# Patient Record
Sex: MALE | Race: OTHER | NOT HISPANIC OR LATINO | URBAN - METROPOLITAN AREA
[De-identification: names, ages, dates, MRNs, and addresses within clinical notes are randomized per-mention and may not be internally consistent; named-entity substitution may affect disease eponyms.]

---

## 2024-02-06 ENCOUNTER — APPOINTMENT (OUTPATIENT)
Dept: LAB | Facility: HOSPITAL | Age: 39
End: 2024-02-06

## 2024-02-06 DIAGNOSIS — Z00.00 ROUTINE GENERAL MEDICAL EXAMINATION AT A HEALTH CARE FACILITY: ICD-10-CM

## 2024-02-06 LAB
HBV SURFACE AB SER-ACNC: 6.26 MIU/ML
TREPONEMA PALLIDUM IGG+IGM AB [PRESENCE] IN SERUM OR PLASMA BY IMMUNOASSAY: NORMAL
VZV IGG SER QL IA: NORMAL

## 2024-02-06 PROCEDURE — 87591 N.GONORRHOEAE DNA AMP PROB: CPT

## 2024-02-06 PROCEDURE — 86706 HEP B SURFACE ANTIBODY: CPT

## 2024-02-06 PROCEDURE — 86780 TREPONEMA PALLIDUM: CPT

## 2024-02-06 PROCEDURE — 87491 CHLMYD TRACH DNA AMP PROBE: CPT

## 2024-02-06 PROCEDURE — 36415 COLL VENOUS BLD VENIPUNCTURE: CPT

## 2024-02-06 PROCEDURE — 86787 VARICELLA-ZOSTER ANTIBODY: CPT

## 2024-02-06 PROCEDURE — 86480 TB TEST CELL IMMUN MEASURE: CPT

## 2024-02-07 LAB
C TRACH DNA SPEC QL NAA+PROBE: NEGATIVE
GAMMA INTERFERON BACKGROUND BLD IA-ACNC: 0.09 IU/ML
M TB IFN-G BLD-IMP: NEGATIVE
M TB IFN-G CD4+ BCKGRND COR BLD-ACNC: -0.02 IU/ML
M TB IFN-G CD4+ BCKGRND COR BLD-ACNC: -0.03 IU/ML
MITOGEN IGNF BCKGRD COR BLD-ACNC: 9.91 IU/ML
N GONORRHOEA DNA SPEC QL NAA+PROBE: NEGATIVE

## 2024-06-14 ENCOUNTER — HOSPITAL ENCOUNTER (EMERGENCY)
Facility: HOSPITAL | Age: 39
Discharge: HOME | End: 2024-06-14
Attending: EMERGENCY MEDICINE
Payer: COMMERCIAL

## 2024-06-14 VITALS
BODY MASS INDEX: 24.57 KG/M2 | DIASTOLIC BLOOD PRESSURE: 91 MMHG | HEART RATE: 63 BPM | WEIGHT: 156.53 LBS | OXYGEN SATURATION: 100 % | HEIGHT: 67 IN | SYSTOLIC BLOOD PRESSURE: 154 MMHG | TEMPERATURE: 98.3 F | RESPIRATION RATE: 20 BRPM

## 2024-06-14 DIAGNOSIS — T63.441A BEE STING: Primary | ICD-10-CM

## 2024-06-14 PROCEDURE — 99284 EMERGENCY DEPT VISIT MOD MDM: CPT | Performed by: PHYSICIAN ASSISTANT

## 2024-06-14 PROCEDURE — 99282 EMERGENCY DEPT VISIT SF MDM: CPT

## 2024-06-14 RX ORDER — PREDNISONE 20 MG/1
60 TABLET ORAL DAILY
Qty: 15 TABLET | Refills: 0 | Status: SHIPPED | OUTPATIENT
Start: 2024-06-14 | End: 2024-06-19

## 2024-06-14 NOTE — ED PROVIDER NOTES
History  Chief Complaint   Patient presents with    Insect Bite     Pt was stung by a bee on the face. Pt has hx of having allergic reactions to bee stings. Pt does not currently have any sings of an allergic reaction. Pt has some swelling under left eye where he was stung     39 yo here after a bee sting. Stung about one hour ago. Left zygomatic area. Iced the area afterwards. Denies any swelling, itching, rash, lightheadedness, n/v, sob, wheezing. Previously had facial swelling after bee sting.       History provided by:  Patient   used: No    Insect Bite  Associated symptoms: no fever and no rash        None       History reviewed. No pertinent past medical history.    History reviewed. No pertinent surgical history.    History reviewed. No pertinent family history.  I have reviewed and agree with the history as documented.    E-Cigarette/Vaping     E-Cigarette/Vaping Substances     Social History     Tobacco Use    Smoking status: Never    Smokeless tobacco: Never   Substance Use Topics    Alcohol use: Never    Drug use: Never       Review of Systems   Constitutional:  Negative for chills and fever.   HENT:  Negative for ear pain and sore throat.    Eyes:  Negative for pain and visual disturbance.   Respiratory:  Negative for cough and shortness of breath.    Cardiovascular:  Negative for chest pain and palpitations.   Gastrointestinal:  Negative for abdominal pain and vomiting.   Genitourinary:  Negative for dysuria and hematuria.   Musculoskeletal:  Negative for arthralgias and back pain.   Skin:  Negative for color change and rash.   Neurological:  Negative for seizures and syncope.   All other systems reviewed and are negative.      Physical Exam  Physical Exam  Vitals and nursing note reviewed.   Constitutional:       General: He is not in acute distress.     Appearance: He is well-developed.   HENT:      Head: Normocephalic and atraumatic.   Eyes:      Conjunctiva/sclera: Conjunctivae  normal.   Cardiovascular:      Rate and Rhythm: Normal rate and regular rhythm.      Heart sounds: No murmur heard.  Pulmonary:      Effort: Pulmonary effort is normal. No respiratory distress.      Breath sounds: Normal breath sounds.   Abdominal:      Palpations: Abdomen is soft.      Tenderness: There is no abdominal tenderness.   Musculoskeletal:         General: No swelling.      Cervical back: Neck supple.   Skin:     General: Skin is warm and dry.      Capillary Refill: Capillary refill takes less than 2 seconds.   Neurological:      Mental Status: He is alert.   Psychiatric:         Mood and Affect: Mood normal.         Vital Signs  ED Triage Vitals [06/14/24 1443]   Temperature Pulse Respirations Blood Pressure SpO2   98.3 °F (36.8 °C) 63 20 154/91 100 %      Temp Source Heart Rate Source Patient Position - Orthostatic VS BP Location FiO2 (%)   Oral Monitor Sitting Left arm --      Pain Score       --           Vitals:    06/14/24 1443   BP: 154/91   Pulse: 63   Patient Position - Orthostatic VS: Sitting         Visual Acuity      ED Medications  Medications - No data to display    Diagnostic Studies  Results Reviewed       None                   No orders to display              Procedures  Procedures         ED Course                               SBIRT 20yo+      Flowsheet Row Most Recent Value   Initial Alcohol Screen: US AUDIT-C     1. How often do you have a drink containing alcohol? 0 Filed at: 06/14/2024 1443   2. How many drinks containing alcohol do you have on a typical day you are drinking?  0 Filed at: 06/14/2024 1443   3a. Male UNDER 65: How often do you have five or more drinks on one occasion? 0 Filed at: 06/14/2024 1443   Audit-C Score 0 Filed at: 06/14/2024 1443   DUANE: How many times in the past year have you...    Used an illegal drug or used a prescription medication for non-medical reasons? Never Filed at: 06/14/2024 1443                      Medical Decision Making  Ddx includes but  is not limited to allergic reaction, localized reaction, doubt angioedema    Plan/MDM: 39 yo with bee sting. No evidence of allergic reaction at this time despite one hour since the sting. Suspect a localized reaction. We discussed taking benadryl and prednisone. He drove to ED and will take benadryl when home. Return parameters provided. Pt understands and agrees with plan.      Risk  Prescription drug management.             Disposition  Final diagnoses:   Bee sting     Time reflects when diagnosis was documented in both MDM as applicable and the Disposition within this note       Time User Action Codes Description Comment    6/14/2024  2:49 PM Humberto Arredondo Add [T63.441A] Bee sting           ED Disposition       ED Disposition   Discharge    Condition   Stable    Date/Time   Fri Jun 14, 2024 3636    Comment   Samer Maricel Kwok discharge to home/self care.                   Follow-up Information       Follow up With Specialties Details Why Contact Info Additional Information    [facility] Emergency Department Emergency Medicine Go to  If symptoms worsen [address] [facility] [address] [id number] [phone] [facility] Emergency Department, [address] [place], [place], [address]            Patient's Medications   Discharge Prescriptions    PREDNISONE 20 MG TABLET    Take 3 tablets (60 mg total) by mouth daily for 5 days       Start Date: 6/14/2024 End Date: 6/19/2024       Order Dose: 60 mg       Quantity: 15 tablet    Refills: 0       No discharge procedures on file.    PDMP Review       None            ED Provider  Electronically Signed by             Humberto Arredondo PA-C  06/14/24 6930

## 2024-06-14 NOTE — Clinical Note
Eliceo Kwok was seen and treated in our emergency department on 6/14/2024.                Diagnosis:     Eliceo  may return to work on return date.    He may return on this date: 06/14/2024         If you have any questions or concerns, please don't hesitate to call.      Humberto Arredondo PA-C    ______________________________           _______________          _______________  Hospital Representative                              Date                                Time

## 2024-12-05 ENCOUNTER — OFFICE VISIT (OUTPATIENT)
Dept: FAMILY MEDICINE CLINIC | Facility: CLINIC | Age: 39
End: 2024-12-05
Payer: COMMERCIAL

## 2024-12-05 VITALS
HEIGHT: 67 IN | BODY MASS INDEX: 24.48 KG/M2 | HEART RATE: 77 BPM | SYSTOLIC BLOOD PRESSURE: 122 MMHG | OXYGEN SATURATION: 97 % | WEIGHT: 156 LBS | DIASTOLIC BLOOD PRESSURE: 72 MMHG

## 2024-12-05 DIAGNOSIS — R79.89 LOW TESTOSTERONE IN MALE: ICD-10-CM

## 2024-12-05 DIAGNOSIS — Z13.1 SCREENING FOR DIABETES MELLITUS: ICD-10-CM

## 2024-12-05 DIAGNOSIS — E78.2 MIXED HYPERLIPIDEMIA: Primary | ICD-10-CM

## 2024-12-05 PROCEDURE — 99203 OFFICE O/P NEW LOW 30 MIN: CPT | Performed by: FAMILY MEDICINE

## 2024-12-05 NOTE — PROGRESS NOTES
Name: Eliceo Kwok      : 1985      MRN: [id number]  Encounter Provider: BHARATHI Rios  Encounter Date: 2024   Encounter department: [facility] [address]  :  Assessment & Plan  Mixed hyperlipidemia  Will obtain updated lipid profile encourage dietary modifications  Orders:    Lipid Panel with Direct LDL reflex; Future    Low testosterone in male  Request available records, discussed previous treatments, obtain updated testosterone chemistries, referral placed for further eval and care with endocrinology  Orders:    Testosterone, free, total; Future    FSH and LH; Future    PSA, Total Screen; Future    TSH + Free T4; Future    Comprehensive metabolic panel; Future    CBC and differential; Future    UA w Reflex to Microscopic w Reflex to Culture; Future    Ambulatory Referral to Endocrinology; Future    Screening for diabetes mellitus    Orders:    Comprehensive metabolic panel; Future           History of Present Illness     Establish /   Recently relocated to [place] , from [place]   Generally ok   Will need provider to prescribe testosterone replacement therapy  Low testosterone since last injection , admittedly has noted issue , diminished sex drive , weight gain , hair loss    Hx   Low testosterone , last check 218 , (  ) while in [place]    Anxiety , previous paxil     Shx none     Fam hx    Mother anxiety , adhd   Father unk         Soc hx      Child 1 yr girl   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 Negative for appetite change, chills and fever.   HENT:  Negative for congestion, dental problem, ear pain, hearing loss, postnasal drip, rhinorrhea, sinus pressure, sinus pain, sneezing, sore throat, tinnitus and voice  "change.    Eyes:  Negative for visual disturbance.   Respiratory:  Negative for apnea, cough, chest tightness and shortness of breath.    Cardiovascular:  Negative for chest pain, palpitations and leg swelling.   Gastrointestinal:  Negative for abdominal pain, blood in stool, constipation, diarrhea, nausea and vomiting.   Endocrine: Negative for cold intolerance, heat intolerance, polydipsia, polyphagia and polyuria.   Genitourinary:  Negative for decreased urine volume, difficulty urinating, dysuria, frequency and hematuria.   Musculoskeletal:  Negative for arthralgias, back pain, gait problem, joint swelling and myalgias.   Skin:  Negative for color change, rash and wound.   Allergic/Immunologic: Negative for environmental allergies and food allergies.   Neurological:  Negative for dizziness, syncope, weakness, light-headedness, numbness and headaches.   Hematological:  Negative for adenopathy. Does not bruise/bleed easily.   Psychiatric/Behavioral:  Negative for sleep disturbance and suicidal ideas. The patient is not nervous/anxious.           Objective   /72   Pulse 77   Ht 5' 7\" (1.702 m)   Wt 70.8 kg (156 lb)   SpO2 97%   BMI 24.43 kg/m²      Physical Exam  Constitutional:       General: He is not in acute distress.     Appearance: He is well-developed. He is not ill-appearing.   HENT:      Head: Normocephalic and atraumatic.      Right Ear: Tympanic membrane normal.      Left Ear: Tympanic membrane normal.   Neck:      Vascular: No carotid bruit.   Cardiovascular:      Rate and Rhythm: Normal rate and regular rhythm.      Heart sounds: Normal heart sounds.   Pulmonary:      Effort: Pulmonary effort is normal.      Breath sounds: Normal breath sounds.   Abdominal:      General: Bowel sounds are normal.      Palpations: Abdomen is soft.   Musculoskeletal:         General: Normal range of motion.      Cervical back: Normal range of motion and neck supple.      Right lower leg: No edema.      Left " lower leg: No edema.   Lymphadenopathy:      Cervical: No cervical adenopathy.   Skin:     General: Skin is warm and dry.      Findings: No lesion or rash.   Neurological:      Mental Status: He is alert and oriented to person, place, and time.      Deep Tendon Reflexes: Reflexes are normal and symmetric.   Psychiatric:         Behavior: Behavior normal.         Thought Content: Thought content normal.         Judgment: Judgment normal.

## 2024-12-10 ENCOUNTER — APPOINTMENT (OUTPATIENT)
Dept: LAB | Facility: HOSPITAL | Age: 39
End: 2024-12-10
Payer: COMMERCIAL

## 2024-12-10 DIAGNOSIS — E78.2 MIXED HYPERLIPIDEMIA: ICD-10-CM

## 2024-12-10 DIAGNOSIS — Z13.1 SCREENING FOR DIABETES MELLITUS: ICD-10-CM

## 2024-12-10 DIAGNOSIS — R79.89 LOW TESTOSTERONE IN MALE: Primary | ICD-10-CM

## 2024-12-10 LAB
ALBUMIN SERPL BCG-MCNC: 4.2 G/DL (ref 3.5–5)
ALP SERPL-CCNC: 48 U/L (ref 34–104)
ALT SERPL W P-5'-P-CCNC: 21 U/L (ref 7–52)
ANION GAP SERPL CALCULATED.3IONS-SCNC: 4 MMOL/L (ref 4–13)
AST SERPL W P-5'-P-CCNC: 18 U/L (ref 13–39)
BASOPHILS # BLD AUTO: 0.06 THOUSANDS/ÂΜL (ref 0–0.1)
BASOPHILS NFR BLD AUTO: 1 % (ref 0–1)
BILIRUB SERPL-MCNC: 0.81 MG/DL (ref 0.2–1)
BILIRUB UR QL STRIP: NEGATIVE
BUN SERPL-MCNC: 17 MG/DL (ref 5–25)
CALCIUM SERPL-MCNC: 9.2 MG/DL (ref 8.4–10.2)
CHLORIDE SERPL-SCNC: 106 MMOL/L (ref 96–108)
CHOLEST SERPL-MCNC: 178 MG/DL (ref ?–200)
CLARITY UR: CLEAR
CO2 SERPL-SCNC: 28 MMOL/L (ref 21–32)
COLOR UR: NORMAL
CREAT SERPL-MCNC: 0.94 MG/DL (ref 0.6–1.3)
EOSINOPHIL # BLD AUTO: 0.22 THOUSAND/ÂΜL (ref 0–0.61)
EOSINOPHIL NFR BLD AUTO: 2 % (ref 0–6)
ERYTHROCYTE [DISTWIDTH] IN BLOOD BY AUTOMATED COUNT: 12.1 % (ref 11.6–15.1)
FSH SERPL-ACNC: 0.8 MIU/ML (ref 1.3–19.3)
GFR SERPL CREATININE-BSD FRML MDRD: 103 ML/MIN/1.73SQ M
GLUCOSE P FAST SERPL-MCNC: 93 MG/DL (ref 65–99)
GLUCOSE UR STRIP-MCNC: NEGATIVE MG/DL
HCT VFR BLD AUTO: 41.7 % (ref 36.5–49.3)
HDLC SERPL-MCNC: 37 MG/DL
HGB BLD-MCNC: 13.8 G/DL (ref 12–17)
HGB UR QL STRIP.AUTO: NEGATIVE
IMM GRANULOCYTES # BLD AUTO: 0.06 THOUSAND/UL (ref 0–0.2)
IMM GRANULOCYTES NFR BLD AUTO: 1 % (ref 0–2)
KETONES UR STRIP-MCNC: NEGATIVE MG/DL
LDLC SERPL CALC-MCNC: 99 MG/DL (ref 0–100)
LEUKOCYTE ESTERASE UR QL STRIP: NEGATIVE
LH SERPL-ACNC: <0.2 MIU/ML (ref 1.2–8.6)
LYMPHOCYTES # BLD AUTO: 2.69 THOUSANDS/ÂΜL (ref 0.6–4.47)
LYMPHOCYTES NFR BLD AUTO: 26 % (ref 14–44)
MCH RBC QN AUTO: 29.9 PG (ref 26.8–34.3)
MCHC RBC AUTO-ENTMCNC: 33.1 G/DL (ref 31.4–37.4)
MCV RBC AUTO: 91 FL (ref 82–98)
MONOCYTES # BLD AUTO: 0.67 THOUSAND/ÂΜL (ref 0.17–1.22)
MONOCYTES NFR BLD AUTO: 6 % (ref 4–12)
NEUTROPHILS # BLD AUTO: 6.86 THOUSANDS/ÂΜL (ref 1.85–7.62)
NEUTS SEG NFR BLD AUTO: 64 % (ref 43–75)
NITRITE UR QL STRIP: NEGATIVE
NRBC BLD AUTO-RTO: 0 /100 WBCS
PH UR STRIP.AUTO: 6.5 [PH]
PLATELET # BLD AUTO: 234 THOUSANDS/UL (ref 149–390)
PMV BLD AUTO: 10 FL (ref 8.9–12.7)
POTASSIUM SERPL-SCNC: 4 MMOL/L (ref 3.5–5.3)
PROT SERPL-MCNC: 6.7 G/DL (ref 6.4–8.4)
PROT UR STRIP-MCNC: NEGATIVE MG/DL
PSA SERPL-MCNC: 0.94 NG/ML (ref 0–4)
RBC # BLD AUTO: 4.61 MILLION/UL (ref 3.88–5.62)
SODIUM SERPL-SCNC: 138 MMOL/L (ref 135–147)
SP GR UR STRIP.AUTO: 1.02 (ref 1–1.03)
T4 FREE SERPL-MCNC: 0.74 NG/DL (ref 0.61–1.12)
TRIGL SERPL-MCNC: 210 MG/DL (ref ?–150)
TSH SERPL DL<=0.05 MIU/L-ACNC: 2.93 UIU/ML (ref 0.45–4.5)
UROBILINOGEN UR STRIP-ACNC: <2 MG/DL
WBC # BLD AUTO: 10.56 THOUSAND/UL (ref 4.31–10.16)

## 2024-12-10 PROCEDURE — 36415 COLL VENOUS BLD VENIPUNCTURE: CPT

## 2024-12-10 PROCEDURE — 84439 ASSAY OF FREE THYROXINE: CPT

## 2024-12-10 PROCEDURE — 83002 ASSAY OF GONADOTROPIN (LH): CPT

## 2024-12-10 PROCEDURE — G0103 PSA SCREENING: HCPCS

## 2024-12-10 PROCEDURE — 84402 ASSAY OF FREE TESTOSTERONE: CPT

## 2024-12-10 PROCEDURE — 81003 URINALYSIS AUTO W/O SCOPE: CPT

## 2024-12-10 PROCEDURE — 85025 COMPLETE CBC W/AUTO DIFF WBC: CPT

## 2024-12-10 PROCEDURE — 84443 ASSAY THYROID STIM HORMONE: CPT

## 2024-12-10 PROCEDURE — 80053 COMPREHEN METABOLIC PANEL: CPT

## 2024-12-10 PROCEDURE — 83001 ASSAY OF GONADOTROPIN (FSH): CPT

## 2024-12-10 PROCEDURE — 84403 ASSAY OF TOTAL TESTOSTERONE: CPT

## 2024-12-10 PROCEDURE — 80061 LIPID PANEL: CPT

## 2024-12-11 LAB
TESTOST FREE SERPL-MCNC: 3.8 PG/ML (ref 8.7–25.1)
TESTOST SERPL-MCNC: 182 NG/DL (ref 264–916)

## 2024-12-13 ENCOUNTER — OFFICE VISIT (OUTPATIENT)
Dept: ENDOCRINOLOGY | Facility: CLINIC | Age: 39
End: 2024-12-13
Payer: COMMERCIAL

## 2024-12-13 VITALS
HEIGHT: 67 IN | TEMPERATURE: 98.1 F | BODY MASS INDEX: 26.34 KG/M2 | DIASTOLIC BLOOD PRESSURE: 80 MMHG | WEIGHT: 167.8 LBS | HEART RATE: 92 BPM | OXYGEN SATURATION: 99 % | SYSTOLIC BLOOD PRESSURE: 144 MMHG

## 2024-12-13 DIAGNOSIS — R79.89 LOW TESTOSTERONE IN MALE: ICD-10-CM

## 2024-12-13 PROCEDURE — 99204 OFFICE O/P NEW MOD 45 MIN: CPT | Performed by: STUDENT IN AN ORGANIZED HEALTH CARE EDUCATION/TRAINING PROGRAM

## 2024-12-13 NOTE — PROGRESS NOTES
Name: Eliceo Kwok      : 1985      MRN: [id number]  Encounter Provider: Flor Parry MD  Encounter Date: 2024   Encounter department: [facility] [place]  :  Assessment & Plan  Low testosterone in male      We reviewed and discussed pathophysiology of hypogonadism, symptoms of hypogonadism,  Discussed potential side effects of replacement, including dyslipidemia, erthyrocytosis, prostate enlargement or unmasking of prostate CA, development of sleep apnea, moodiness and agresssion due to higher testosterone  Levels.  He was evaluated for nonspecific symptoms, with testosterone which was low normal, for which he was started on testosterone therapy in [place], no prior records available, if there was any further evaluation to differentiate between primary hypogonadism versus secondary causes, if there was any pathology imaging done or actually had any hypogonadism given lack of convincing symptoms of hypogonadism.  He is clinically well visualized, with intact secondary sexual characteristics, testosterone show total testosterone 182 ng per DL and free testosterone of 3.8 pg/ml, with FSH of 0.8 and LH of less than 0.2, which is in favor of prior androgen use.  Given adverse reaction of TRT without indication, we did agree to continue monitoring without TRT and to check testosterone periodically, if remains low, with above pattern, we may proceed with pituitary MRI.  At this time, lifestyle modifications including regular daily exercise and balanced diet encouraged.  Return back in 6 months.  Send me prior record if it is available.  Orders:    Ambulatory Referral to Endocrinology        History of Present Illness   HPI  Eliceo Kwok is a 39 y.o. male who presents for evaluation of hypogonadism at the request of BHARATHI Rios.      Started noticing very low sex drive, low concentration,  Testosterone was very low mid , which was low, he states  "that he started exercising and eating better which help.    Then, wife got pregnant, and having kid, and changes in his lift , he started having difficulty concentration, shrinking muscles, mood change and getting emotional ,   he was evaluated by endocrinologist in [place], and he was started on TRT with testosterone Undecenoate 250 mg every 3 months.   Last dose was in June 2024.  He reports improvement in his symptoms        Puberty:  had a normal puberty  Fathered children No  Shaving normally No     Gonadal ROS:  Fatigue : no . 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    Medical History Reviewed by provider this encounter:     .  No current outpatient medications on file prior to visit.     No current facility-administered medications on file prior to visit.         Objective   /80 (BP Location: Right arm, Patient Position: Sitting, Cuff Size: Adult)   Pulse 92   Temp 98.1 °F (36.7 °C) (Temporal)   Ht 5' 7\" (1.702 m)   Wt 76.1 kg (167 lb 12.8 oz)   SpO2 99%   BMI 26.28 kg/m²      Physical Exam  Vitals and nursing note reviewed.   Constitutional:       General: He is not in acute distress.     Appearance: He is well-developed.   HENT:      Head: Normocephalic and atraumatic.   Eyes:      Conjunctiva/sclera: Conjunctivae normal.   Cardiovascular:      Rate and Rhythm: Normal rate and regular rhythm.      Heart sounds: No murmur heard.  Pulmonary:      Effort: Pulmonary effort is normal. No respiratory distress.      Breath sounds: Normal breath sounds. "   Musculoskeletal:         General: No swelling.      Cervical back: Neck supple.   Skin:     General: Skin is warm and dry.   Neurological:      Mental Status: He is alert.   Psychiatric:         Mood and Affect: Mood normal.         Administrative Statements   I have spent a total time of 40 minutes in caring for this patient on the day of the visit/encounter including Diagnostic results, Prognosis, Risks and benefits of tx options, Instructions for management, Patient and family education, Risk factor reductions, Impressions, Counseling / Coordination of care, Documenting in the medical record, Reviewing / ordering tests, medicine, procedures  , and Obtaining or reviewing history  .     Component      Latest Ref Rng 12/10/2024   Sodium      135 - 147 mmol/L 138    Potassium      3.5 - 5.3 mmol/L 4.0    Chloride      96 - 108 mmol/L 106    Carbon Dioxide      21 - 32 mmol/L 28    ANION GAP      4 - 13 mmol/L 4    BUN      5 - 25 mg/dL 17    Creatinine      0.60 - 1.30 mg/dL 0.94    GLUCOSE, FASTING      65 - 99 mg/dL 93    Calcium      8.4 - 10.2 mg/dL 9.2    AST      13 - 39 U/L 18    ALT      7 - 52 U/L 21    ALK PHOS      34 - 104 U/L 48    Total Protein      6.4 - 8.4 g/dL 6.7    Albumin      3.5 - 5.0 g/dL 4.2    Total Bilirubin      0.20 - 1.00 mg/dL 0.81    GFR, Calculated      ml/min/1.73sq m 103    TESTOSTERONE FREE      8.7 - 25.1 pg/mL 3.8 (L)    Testosterone, Total, LC/MS      264 - 916 ng/dL 182 (L)    TSH 3RD GENERATON      0.450 - 4.500 uIU/mL 2.930    FREE T4      0.61 - 1.12 ng/dL 0.74    FSH, POC      1.3 - 19.3 mIU/mL 0.8 (L)    LUTEINIZING HORMONE      1.2 - 8.6 mIU/mL <0.2 (L)       Legend:  (L) Low